# Patient Record
Sex: FEMALE | URBAN - METROPOLITAN AREA
[De-identification: names, ages, dates, MRNs, and addresses within clinical notes are randomized per-mention and may not be internally consistent; named-entity substitution may affect disease eponyms.]

---

## 2022-12-15 ENCOUNTER — APPOINTMENT (RX ONLY)
Dept: URBAN - METROPOLITAN AREA CLINIC 83 | Facility: CLINIC | Age: 67
Setting detail: DERMATOLOGY
End: 2022-12-15

## 2022-12-15 DIAGNOSIS — Z41.9 ENCOUNTER FOR PROCEDURE FOR PURPOSES OTHER THAN REMEDYING HEALTH STATE, UNSPECIFIED: ICD-10-CM

## 2022-12-15 PROCEDURE — ? RECOMMENDATIONS

## 2022-12-15 PROCEDURE — ? COSMETIC CONSULTATION: LASER RESURFACING

## 2022-12-15 PROCEDURE — ? COSMETIC CONSULTATION: THREAD LIFT

## 2022-12-15 PROCEDURE — ? PRESCRIPTION MEDICATION MANAGEMENT

## 2022-12-15 PROCEDURE — ? COSMETIC QUOTE

## 2022-12-15 PROCEDURE — ? PRESCRIPTION

## 2022-12-15 PROCEDURE — ? COSMETIC CONSULTATION: FILLERS

## 2022-12-15 PROCEDURE — ? COSMETIC CONSULTATION: PRP

## 2022-12-15 RX ORDER — CIPROFLOXACIN HYDROCHLORIDE 500 MG/1
TABLET, FILM COATED ORAL
Qty: 10 | Refills: 0 | Status: ERX | COMMUNITY
Start: 2022-12-15

## 2022-12-15 RX ORDER — MUPIROCIN 20 MG/G
OINTMENT TOPICAL
Qty: 22 | Refills: 0 | Status: ERX | COMMUNITY
Start: 2022-12-15

## 2022-12-15 RX ORDER — ALPRAZOLAM 0.5 MG/1
TABLET ORAL
Qty: 8 | Refills: 0 | Status: ERX | COMMUNITY
Start: 2022-12-15

## 2022-12-15 RX ORDER — VALACYCLOVIR HYDROCHLORIDE 1 G/1
TABLET, FILM COATED ORAL
Qty: 7 | Refills: 0 | Status: ERX | COMMUNITY
Start: 2022-12-15

## 2022-12-15 RX ADMIN — VALACYCLOVIR HYDROCHLORIDE: 1 TABLET, FILM COATED ORAL at 00:00

## 2022-12-15 RX ADMIN — MUPIROCIN: 20 OINTMENT TOPICAL at 00:00

## 2022-12-15 RX ADMIN — ALPRAZOLAM: 0.5 TABLET ORAL at 00:00

## 2022-12-15 RX ADMIN — CIPROFLOXACIN HYDROCHLORIDE: 500 TABLET, FILM COATED ORAL at 00:00

## 2022-12-15 ASSESSMENT — LOCATION SIMPLE DESCRIPTION DERM: LOCATION SIMPLE: RIGHT CHEEK

## 2022-12-15 ASSESSMENT — LOCATION DETAILED DESCRIPTION DERM: LOCATION DETAILED: RIGHT INFERIOR CENTRAL MALAR CHEEK

## 2022-12-15 ASSESSMENT — LOCATION ZONE DERM: LOCATION ZONE: FACE

## 2022-12-15 NOTE — PROCEDURE: RECOMMENDATIONS
Detail Level: Zone
Render Risk Assessment In Note?: no
Recommendation Preamble: The following recommendations were made during the visit:
Recommendations (Free Text): CO2RE treatment first (Fusion Mode) followed by collagen stimulating threads one month later. \\nAfter 6 months repeat CO2RE (Deep Mode) around the mouth and collagen stimulating threads again.

## 2022-12-15 NOTE — PROCEDURE: COSMETIC QUOTE
Breast Procedure 8 Percentage Discount: 0
Laser 4 Price/Unit (In Dollars- Use Only Numbers And Decimals): 1250.00
Injectable 14 Free Text Discount (In Dollars- Use Only Numbers And Decimals): 0.00
Face Procedure 1: Collagen Stimulating Threads
Injectable  11 Price/Unit (In Dollars- Use Only Numbers And Decimals): 12.00
Include Sales Tax On Surgeon's Fees: Yes
Facility Fee Units (Optional): 1
Laser 18 Price/Unit (In Dollars- Use Only Numbers And Decimals): 2000.00
Apply Anesthesia Fee: No
Laser 1 Price/Unit (In Dollars- Use Only Numbers And Decimals): 3000.00
Misc Procedure 2 Price/Unit (In Dollars- Use Only Numbers And Decimals): 500.00
Injectable 4 Price/Unit (In Dollars- Use Only Numbers And Decimals): 795.00
Misc Procedure 7: [address]
Laser 15 Price/Unit (In Dollars- Use Only Numbers And Decimals): 300.00
Face Procedure 1 Units: 2
Laser 5: SK Removal-Face
Injectable 1 Price/Unit (In Dollars- Use Only Numbers And Decimals): 650.00
Misc Procedure 3: Collagen Stimulating threads only
Laser 12 Price/Unit (In Dollars- Use Only Numbers And Decimals): 900.00
Injectable  12: Xeomin
Detail Level: Zone
Face Procedure 2 Price/Unit (In Dollars- Use Only Numbers And Decimals): 1500.00
Laser 2: Full Face and Neck-Fusion
Injectable 2: Voluma
Laser 16: Anne
Injectable 5 Price/Unit (In Dollars- Use Only Numbers And Decimals): 395.00
Laser 6: Sk Removal-Face and Neck
Laser 16 Price/Unit (In Dollars- Use Only Numbers And Decimals): 400.00
Injectable  13: Kybella/Vial
Injectable 2 Coker/Unit (In Dollars- Use Only Numbers And Decimals): 800.00
Laser 3: Full Face-Mid Depth
Misc Procedure 4: Threads Neck Lift
Laser 17: LHR-Arms
Injectable 6 Price/Unit (In Dollars- Use Only Numbers And Decimals): 700.00
Injectable 3: Versa
Laser 14: LHR-Lip
Misc Procedure 1: Threadlift
Laser 7 Price/Unit (In Dollars- Use Only Numbers And Decimals): 1000.00
Laser 11: IPL Photofacial
Injectable 7: Belotero
Laser 18: LHR-Legs/Back-TBD
Injectable 7 Price/Unit (In Dollars- Use Only Numbers And Decimals): 500.000
Laser 1: Full Face-Deep & Fusion with PRP
Injectable 4: Volbella 1cc
Misc Procedure 2: Threadlift Collagen Add-on
Injectable  11: Botox
Laser 15: Leslie Pop
Injectable 1: Radiesse
Laser 12: IPL Photoface-Face and Neck
Misc Procedure 7 Price/Unit (In Dollars- Use Only Numbers And Decimals): 50.00
Face Procedure 2: Lower Thread Lift
Injectable 5: Volbella 0.55cc
Laser 13: IPL Photofacial-Face, Neck, Chest
Injectable  13 Price/Unit (In Dollars- Use Only Numbers And Decimals): 600.00
Injectable 6: Cinderella Ast
Laser 3 Price/Unit (In Dollars- Use Only Numbers And Decimals): 1800.00
Misc Procedure 1 Price/Unit (In Dollars- Use Only Numbers And Decimals): 2400.00
Laser 7: VV/PPP on Genitals
Laser 14 Price/Unit (In Dollars- Use Only Numbers And Decimals): 250.00
Misc Procedure 5: Threads Eyelift
Injectable  14: Qwo/Session
Laser 4: Full Face-Light
Misc Procedure 6: Threads Browlift

## 2022-12-15 NOTE — PROCEDURE: PRESCRIPTION MEDICATION MANAGEMENT
Render In Strict Bullet Format?: No
Detail Level: Zone
Initiate Treatment: Cipro 500mg tablets - take 1 tab by mouth twice daily, start 24 hours before scheduled procedure. \\nValtrex 1g tablets - take 1 tab by mouth daily x7 days, start 24 hours before scheduled procedure. \\nMupirocin 2% ointment - after procedure, mix 1:1 with Aquaphor and apply to face 3-4x daily x7 days. \\nXanax 0.5mg  tablets - take 1 tab by mouth q8h prn, start 30 min before scheduled procedure.

## 2022-12-15 NOTE — HPI: COSMETIC CONSULTATION
Additional History: Patient states she has had sculptra, juvederm, and botox previously done 1 year ago.

## 2022-12-29 ENCOUNTER — APPOINTMENT (RX ONLY)
Dept: URBAN - METROPOLITAN AREA CLINIC 83 | Facility: CLINIC | Age: 67
Setting detail: DERMATOLOGY
End: 2022-12-29

## 2022-12-29 DIAGNOSIS — Z41.9 ENCOUNTER FOR PROCEDURE FOR PURPOSES OTHER THAN REMEDYING HEALTH STATE, UNSPECIFIED: ICD-10-CM

## 2022-12-29 PROCEDURE — ? PLATELET RICH PLASMA INJECTION

## 2022-12-29 PROCEDURE — ? CO2RE

## 2022-12-29 PROCEDURE — ? INVENTORY

## 2022-12-29 ASSESSMENT — LOCATION ZONE DERM
LOCATION ZONE: NECK
LOCATION ZONE: FACE

## 2022-12-29 ASSESSMENT — LOCATION DETAILED DESCRIPTION DERM
LOCATION DETAILED: LEFT INFERIOR ANTERIOR NECK
LOCATION DETAILED: LEFT INFERIOR CENTRAL MALAR CHEEK

## 2022-12-29 ASSESSMENT — LOCATION SIMPLE DESCRIPTION DERM
LOCATION SIMPLE: LEFT ANTERIOR NECK
LOCATION SIMPLE: LEFT CHEEK

## 2022-12-29 NOTE — PROCEDURE: CO2RE
Topical Anesthesia?: 12% lidocaine, 12% tetracaine
Laser Mode: Light
Ring Size: 3
[employer] (Mj): 56.0
[employer] (Mj): 80.8
External Cooling Fan Speed: 5
Length Of Topical Anesthesia Application (Optional): 90 minutes
Corneal Shield Text: A corneal shield was inserted after appropriate application of topical anesthesia.
Repeat (Sec): 0.50
Treatment Number: 1
Laser Mode: Mid
Add Another Laser Setting?: no
Consent: Written consent obtained, risks reviewed including but not limited to crusting, scabbing, blistering, scarring, darker or lighter pigmentary change, incomplete improvement of dyschromia, wrinkles, prolonged erythema and facial swelling, infection and bleeding.
Post-Care Instructions: I reviewed with the patient in detail post-care instructions. Patient should avoid sun until area fully healed. Pt should apply vaseline to treated areas, and remove crusts gently with water-vinegar soaks.
Laser Mode: Fusion
Fractional Coverage (%): [address] [facility]
Price (Use Numbers Only, No Special Characters Or $): 2285
Add Another Laser Setting?: yes
Fractional Coverage (%): [id number] [address]
Detail Level: Zone
[employer] (Mj): [address]

## 2022-12-29 NOTE — PROCEDURE: PLATELET RICH PLASMA INJECTION
Standard Default Technique For Making Prp: Blood with drawn using a butterfly and transferred with a needless transfer kit to the supplied [address] was spun for 9 min. The plasma was then drawn from the vial with a needless transfer device to 1cc syringes and then injected into the affected areas with a 30g needle.
Depth In Mm (Will Not Render If 0): 0
External Cooling Fan Speed: 5
Anesthesia Type: 1% lidocaine with epinephrine
Number Of Tubes Drawn: 1
Show Additional Techniques: Yes
Post-Care Instructions: After the procedure, take precautions against sun exposure. Do not wash the scalp for at least 24 hours. Do not wear a hat or excessively brush hair for 24 hours.
Who Performed The Venipuncture?: Mike Burns
Consent: Written consent obtained, risks reviewed including but not limited to pain, scarring, infection and incomplete improvement. Patient understands the procedure is cosmetic in nature and will require out of pocket payment.
Which Technique?: Default
Venipuncture Paragraph: An alcohol pad was applied to the venipuncture site. Venipuncture was performed using a butterfly needle. Pressure and a bandaid was applied to the site. No complications were noted.
Detail Level: Zone
Amount Injected At This Location In Cc (Will Not Render If 0): 5.5
Site Of Venipuncture?: left ventral forearm

## 2023-01-30 ENCOUNTER — APPOINTMENT (RX ONLY)
Dept: URBAN - METROPOLITAN AREA CLINIC 83 | Facility: CLINIC | Age: 68
Setting detail: DERMATOLOGY
End: 2023-01-30

## 2023-01-30 DIAGNOSIS — Z41.9 ENCOUNTER FOR PROCEDURE FOR PURPOSES OTHER THAN REMEDYING HEALTH STATE, UNSPECIFIED: ICD-10-CM

## 2023-01-30 PROCEDURE — ? COSMETIC FOLLOW-UP

## 2023-01-30 PROCEDURE — ? COSMETIC CONSULTATION: LASER RESURFACING

## 2023-01-30 PROCEDURE — ? PRODUCT LINE (OFFICE PRODUCTS)

## 2023-01-30 PROCEDURE — ? COSMETIC QUOTE

## 2023-01-30 ASSESSMENT — LOCATION SIMPLE DESCRIPTION DERM
LOCATION SIMPLE: CHEST
LOCATION SIMPLE: LEFT CHEEK

## 2023-01-30 ASSESSMENT — LOCATION ZONE DERM
LOCATION ZONE: TRUNK
LOCATION ZONE: FACE

## 2023-01-30 ASSESSMENT — LOCATION DETAILED DESCRIPTION DERM
LOCATION DETAILED: MIDDLE STERNUM
LOCATION DETAILED: LEFT INFERIOR CENTRAL MALAR CHEEK

## 2023-01-30 NOTE — PROCEDURE: PRODUCT LINE (OFFICE PRODUCTS)
Product 50 Units: 0
Product 15 Price (In Dollars - Numeric Only, No Special Characters Or $): 0.00
Name Of Product 1: DefenAge 8 in 1 Bioserum
Product 1 Price (In Dollars - Numeric Only, No Special Characters Or $): 220.42
Product 1 Units: 1
Product 1 Application Directions: Apply twice daily.
Name Of Product 2: Nutrafol Collagen Infusion powder
Product 2 Price (In Dollars - Numeric Only, No Special Characters Or $): 40.66
Detail Level: Zone
Assigning Risk Information: Per AMA, level of risk is based upon consequences of the problem(s) addressed at the encounter when appropriately treated. Risk also includes medical decision making related to the need to initiate or forego further testing, treatment and/or hospitalization. Over the counter medication are assigned a risk level of low. Prescription medication management is assigned a risk level of moderate.
Risk Of Complication Category: Low (OTC Medications)
Allow Plan To Count Towards E/M Coding: Yes

## 2023-01-30 NOTE — PROCEDURE: COSMETIC QUOTE
Laser 7 Price/Unit (In Dollars- Use Only Numbers And Decimals): 1000.00
Body Procedure 6 Price/Unit (In Dollars- Use Only Numbers And Decimals): 0.00
Laser 14 Units: 0
Laser 16 Price/Unit (In Dollars- Use Only Numbers And Decimals): 400.00
Misc Procedure 1: Threadlift
Laser 11: IPL Photofacial
Injectable 7 Price/Unit (In Dollars- Use Only Numbers And Decimals): 500.000
Injectable  11: Botox
Laser 4 Price/Unit (In Dollars- Use Only Numbers And Decimals): 750.00
Injectable 4 Price/Unit (In Dollars- Use Only Numbers And Decimals): 795.00
Laser 17: LHR-Arms
Misc Procedure 5 Price/Unit (In Dollars- Use Only Numbers And Decimals): 2000.00
Injectable 1 Price/Unit (In Dollars- Use Only Numbers And Decimals): 650.00
Injectable  12 Price/Unit (In Dollars- Use Only Numbers And Decimals): 12.00
Laser 1 Price/Unit (In Dollars- Use Only Numbers And Decimals): 2500.00
Injectable 5: Volbella 0.55cc
Laser 5: SK Removal-Face
Misc Procedure 2 Price/Unit (In Dollars- Use Only Numbers And Decimals): 500.00
Laser 12 Price/Unit (In Dollars- Use Only Numbers And Decimals): 900.00
Laser 18: LHR-Legs/Back-TBD
Injectable 5 Price/Unit (In Dollars- Use Only Numbers And Decimals): 395.00
Include Signature: No
Laser 2 Price/Unit (In Dollars- Use Only Numbers And Decimals): 3000.00
Laser 6: Sk Removal-Face and Neck
Injectable 6: Nilda Maier
Misc Procedure 3 Price/Unit (In Dollars- Use Only Numbers And Decimals): 1250.00
Injectable  13 Price/Unit (In Dollars- Use Only Numbers And Decimals): 600.00
Laser 13 Price/Unit (In Dollars- Use Only Numbers And Decimals): 1500.00
Injectable 2 Coker/Unit (In Dollars- Use Only Numbers And Decimals): 800.00
Misc Procedure 7: [address]
Laser 14: LHR-Lip
Injectable 3: Versa
Laser 3: Full Face-Mid Depth
Injectable  14: Qwo/Session
Misc Procedure 4: Threads Neck Lift
Laser 15 Price/Unit (In Dollars- Use Only Numbers And Decimals): 300.00
Facility Fee Units (Optional): 1
Injectable 7: Belotero
Laser 4: CO2RE - Chest
Misc Procedure 5: Threads Eyelift
Injectable 4: Volbella 1cc
Laser 15: Emmanuel Abreu
Misc Procedure 1 Price/Unit (In Dollars- Use Only Numbers And Decimals): 3150.00
Laser 1: Full Face-Fusion
Injectable 1: Radiesse
Detail Level: Zone
Laser 12: IPL Photoface-Face and Neck
Injectable  12: Xeomin
Misc Procedure 2: Threadlift Collagen Add-on
Include Sales Tax On Surgeon's Fees: Yes
Misc Procedure 6: Threads Browlift
Injectable  13: Kybella/Vial
Misc Procedure 3: Collagen Stimulating threads only
Laser 13: IPL Photofacial-Face, Neck, Chest
Injectable 2: Voluma
Laser 2: Full Face and Neck-Fusion
Injectable 6 Price/Unit (In Dollars- Use Only Numbers And Decimals): 700.00
Misc Procedure 7 Price/Unit (In Dollars- Use Only Numbers And Decimals): 50.00
Laser 14 Price/Unit (In Dollars- Use Only Numbers And Decimals): 250.00
Laser 7: VV/PPP on Genitals
Laser 16: Anne
Laser 3 Price/Unit (In Dollars- Use Only Numbers And Decimals): 1800.00

## 2023-02-16 ENCOUNTER — RX ONLY (OUTPATIENT)
Age: 68
Setting detail: RX ONLY
End: 2023-02-16

## 2023-02-16 RX ORDER — CIPROFLOXACIN HYDROCHLORIDE 500 MG/1
TABLET, FILM COATED ORAL
Qty: 10 | Refills: 0 | Status: ERX

## 2023-02-16 RX ORDER — ALPRAZOLAM 0.5 MG/1
TABLET ORAL
Qty: 8 | Refills: 0 | Status: ERX

## 2023-02-16 RX ORDER — VALACYCLOVIR HYDROCHLORIDE 1 G/1
TABLET, FILM COATED ORAL
Qty: 7 | Refills: 0 | Status: ERX

## 2023-02-16 RX ORDER — MUPIROCIN 20 MG/G
OINTMENT TOPICAL
Qty: 22 | Refills: 0 | Status: ERX

## 2023-03-17 ENCOUNTER — APPOINTMENT (RX ONLY)
Dept: URBAN - METROPOLITAN AREA CLINIC 82 | Facility: CLINIC | Age: 68
Setting detail: DERMATOLOGY
End: 2023-03-17

## 2023-03-17 DIAGNOSIS — Z41.9 ENCOUNTER FOR PROCEDURE FOR PURPOSES OTHER THAN REMEDYING HEALTH STATE, UNSPECIFIED: ICD-10-CM

## 2023-03-17 PROCEDURE — ? CO2RE

## 2023-03-17 NOTE — PROCEDURE: CO2RE
Laser Mode: Light
External Cooling Fan Speed: 5
Add Another Laser Setting?: no
Length Of Topical Anesthesia Application (Optional): 60 minutes
Consent: Written consent obtained, risks reviewed including but not limited to crusting, scabbing, blistering, scarring, darker or lighter pigmentary change, incomplete improvement of dyschromia, wrinkles, prolonged erythema and facial swelling, infection and bleeding.
Detail Level: Zone
Treatment Number: 1
Price (Use Numbers Only, No Special Characters Or $): Meng
Was A Corneal Shield Used?: Yes
Post-Care Instructions: I reviewed with the patient in detail post-care instructions. Patient should avoid sun until area fully healed. Pt should apply vaseline to treated areas, and remove crusts gently with water-vinegar soaks.
Corneal Shield Text: A corneal shield was inserted after appropriate application of topical anesthesia.
Topical Anesthesia?: 12% lidocaine, 12% tetracaine

## 2023-04-03 ENCOUNTER — APPOINTMENT (RX ONLY)
Dept: URBAN - METROPOLITAN AREA CLINIC 83 | Facility: CLINIC | Age: 68
Setting detail: DERMATOLOGY
End: 2023-04-03

## 2023-04-03 DIAGNOSIS — Z41.9 ENCOUNTER FOR PROCEDURE FOR PURPOSES OTHER THAN REMEDYING HEALTH STATE, UNSPECIFIED: ICD-10-CM

## 2023-04-03 PROCEDURE — ? COSMETIC FOLLOW-UP

## 2023-04-03 PROCEDURE — ? PHOTO-DOCUMENTATION

## 2023-04-03 NOTE — PROCEDURE: COSMETIC FOLLOW-UP
Detail Level: Zone
Comments (Free Text): Patient stated she is not happy with results because the spots she wanted gone are still there. Discussed with patient she would benefit with another treatment. Patient agreed to sometime in June for no cost touch up. Rec patient to begin using 4% HQ BID and wear zinc based sunblock daily. Discouraged sun bathing. Advised if patient exposes self to sun or worsens sun spots prior to treatment in June, touch ups will not be complimentary.